# Patient Record
Sex: FEMALE | Race: BLACK OR AFRICAN AMERICAN | NOT HISPANIC OR LATINO | URBAN - METROPOLITAN AREA
[De-identification: names, ages, dates, MRNs, and addresses within clinical notes are randomized per-mention and may not be internally consistent; named-entity substitution may affect disease eponyms.]

---

## 2024-01-12 ENCOUNTER — HOSPITAL ENCOUNTER (EMERGENCY)
Facility: HOSPITAL | Age: 44
Discharge: HOME | End: 2024-01-12
Attending: STUDENT IN AN ORGANIZED HEALTH CARE EDUCATION/TRAINING PROGRAM
Payer: MEDICAID

## 2024-01-12 VITALS
TEMPERATURE: 98 F | HEART RATE: 69 BPM | BODY MASS INDEX: 26.57 KG/M2 | OXYGEN SATURATION: 99 % | DIASTOLIC BLOOD PRESSURE: 81 MMHG | WEIGHT: 150 LBS | SYSTOLIC BLOOD PRESSURE: 173 MMHG | RESPIRATION RATE: 21 BRPM

## 2024-01-12 DIAGNOSIS — R51.9 NONINTRACTABLE HEADACHE, UNSPECIFIED CHRONICITY PATTERN, UNSPECIFIED HEADACHE TYPE: Primary | ICD-10-CM

## 2024-01-12 DIAGNOSIS — R03.0 ELEVATED BLOOD PRESSURE READING: ICD-10-CM

## 2024-01-12 LAB
ALBUMIN SERPL BCP-MCNC: 4.1 G/DL (ref 3.5–5.2)
ALP SERPL-CCNC: 50 U/L (ref 55–135)
ALT SERPL W/O P-5'-P-CCNC: 15 U/L (ref 10–44)
ANION GAP SERPL CALC-SCNC: 11 MMOL/L (ref 8–16)
AST SERPL-CCNC: 22 U/L (ref 10–40)
BASOPHILS # BLD AUTO: 0.04 K/UL (ref 0–0.2)
BASOPHILS NFR BLD: 0.7 % (ref 0–1.9)
BILIRUB SERPL-MCNC: 0.5 MG/DL (ref 0.1–1)
BILIRUB UR QL STRIP: NEGATIVE
BNP SERPL-MCNC: 42 PG/ML (ref 0–99)
BUN SERPL-MCNC: 9 MG/DL (ref 6–20)
CALCIUM SERPL-MCNC: 8.7 MG/DL (ref 8.7–10.5)
CHLORIDE SERPL-SCNC: 107 MMOL/L (ref 95–110)
CLARITY UR: CLEAR
CO2 SERPL-SCNC: 21 MMOL/L (ref 23–29)
COLOR UR: COLORLESS
CREAT SERPL-MCNC: 0.8 MG/DL (ref 0.5–1.4)
DIFFERENTIAL METHOD BLD: ABNORMAL
EOSINOPHIL # BLD AUTO: 0.1 K/UL (ref 0–0.5)
EOSINOPHIL NFR BLD: 1.5 % (ref 0–8)
ERYTHROCYTE [DISTWIDTH] IN BLOOD BY AUTOMATED COUNT: 13.8 % (ref 11.5–14.5)
EST. GFR  (NO RACE VARIABLE): >60 ML/MIN/1.73 M^2
GLUCOSE SERPL-MCNC: 80 MG/DL (ref 70–110)
GLUCOSE UR QL STRIP: NEGATIVE
HCT VFR BLD AUTO: 35.2 % (ref 37–48.5)
HGB BLD-MCNC: 11.9 G/DL (ref 12–16)
HGB UR QL STRIP: ABNORMAL
IMM GRANULOCYTES # BLD AUTO: 0.01 K/UL (ref 0–0.04)
IMM GRANULOCYTES NFR BLD AUTO: 0.2 % (ref 0–0.5)
KETONES UR QL STRIP: NEGATIVE
LEUKOCYTE ESTERASE UR QL STRIP: NEGATIVE
LYMPHOCYTES # BLD AUTO: 1.3 K/UL (ref 1–4.8)
LYMPHOCYTES NFR BLD: 23.4 % (ref 18–48)
MCH RBC QN AUTO: 33.2 PG (ref 27–31)
MCHC RBC AUTO-ENTMCNC: 33.8 G/DL (ref 32–36)
MCV RBC AUTO: 98 FL (ref 82–98)
MICROSCOPIC COMMENT: NORMAL
MONOCYTES # BLD AUTO: 0.5 K/UL (ref 0.3–1)
MONOCYTES NFR BLD: 8.3 % (ref 4–15)
NEUTROPHILS # BLD AUTO: 3.6 K/UL (ref 1.8–7.7)
NEUTROPHILS NFR BLD: 65.9 % (ref 38–73)
NITRITE UR QL STRIP: NEGATIVE
NRBC BLD-RTO: 0 /100 WBC
PH UR STRIP: 5 [PH] (ref 5–8)
PLATELET # BLD AUTO: 323 K/UL (ref 150–450)
PMV BLD AUTO: 9.5 FL (ref 9.2–12.9)
POTASSIUM SERPL-SCNC: 4.1 MMOL/L (ref 3.5–5.1)
PROT SERPL-MCNC: 7.8 G/DL (ref 6–8.4)
PROT UR QL STRIP: NEGATIVE
RBC # BLD AUTO: 3.58 M/UL (ref 4–5.4)
RBC #/AREA URNS HPF: 2 /HPF (ref 0–4)
SODIUM SERPL-SCNC: 139 MMOL/L (ref 136–145)
SP GR UR STRIP: 1.01 (ref 1–1.03)
SQUAMOUS #/AREA URNS HPF: 4 /HPF
TROPONIN I SERPL DL<=0.01 NG/ML-MCNC: <0.006 NG/ML (ref 0–0.03)
URN SPEC COLLECT METH UR: ABNORMAL
UROBILINOGEN UR STRIP-ACNC: NEGATIVE EU/DL
WBC # BLD AUTO: 5.51 K/UL (ref 3.9–12.7)
WBC #/AREA URNS HPF: 3 /HPF (ref 0–5)

## 2024-01-12 PROCEDURE — 99285 EMERGENCY DEPT VISIT HI MDM: CPT | Mod: 25

## 2024-01-12 PROCEDURE — 96374 THER/PROPH/DIAG INJ IV PUSH: CPT

## 2024-01-12 PROCEDURE — 93005 ELECTROCARDIOGRAM TRACING: CPT

## 2024-01-12 PROCEDURE — 25000003 PHARM REV CODE 250: Performed by: STUDENT IN AN ORGANIZED HEALTH CARE EDUCATION/TRAINING PROGRAM

## 2024-01-12 PROCEDURE — 25000003 PHARM REV CODE 250

## 2024-01-12 PROCEDURE — 80053 COMPREHEN METABOLIC PANEL: CPT

## 2024-01-12 PROCEDURE — 93010 ELECTROCARDIOGRAM REPORT: CPT | Mod: ,,, | Performed by: INTERNAL MEDICINE

## 2024-01-12 PROCEDURE — 85025 COMPLETE CBC W/AUTO DIFF WBC: CPT

## 2024-01-12 PROCEDURE — 84484 ASSAY OF TROPONIN QUANT: CPT

## 2024-01-12 PROCEDURE — 63600175 PHARM REV CODE 636 W HCPCS

## 2024-01-12 PROCEDURE — 83880 ASSAY OF NATRIURETIC PEPTIDE: CPT

## 2024-01-12 PROCEDURE — 81000 URINALYSIS NONAUTO W/SCOPE: CPT

## 2024-01-12 RX ORDER — ONDANSETRON HYDROCHLORIDE 2 MG/ML
4 INJECTION, SOLUTION INTRAVENOUS
Status: COMPLETED | OUTPATIENT
Start: 2024-01-12 | End: 2024-01-12

## 2024-01-12 RX ORDER — AMLODIPINE BESYLATE 5 MG/1
5 TABLET ORAL
Status: COMPLETED | OUTPATIENT
Start: 2024-01-12 | End: 2024-01-12

## 2024-01-12 RX ORDER — ACETAMINOPHEN 325 MG/1
650 TABLET ORAL
Status: COMPLETED | OUTPATIENT
Start: 2024-01-12 | End: 2024-01-12

## 2024-01-12 RX ORDER — BUTALBITAL, ACETAMINOPHEN AND CAFFEINE 50; 325; 40 MG/1; MG/1; MG/1
1 TABLET ORAL EVERY 6 HOURS PRN
Qty: 15 TABLET | Refills: 0 | Status: SHIPPED | OUTPATIENT
Start: 2024-01-12 | End: 2024-01-17

## 2024-01-12 RX ADMIN — ONDANSETRON 4 MG: 2 INJECTION INTRAMUSCULAR; INTRAVENOUS at 05:01

## 2024-01-12 RX ADMIN — AMLODIPINE BESYLATE 5 MG: 5 TABLET ORAL at 05:01

## 2024-01-12 RX ADMIN — ACETAMINOPHEN 650 MG: 325 TABLET ORAL at 05:01

## 2024-01-12 NOTE — ED PROVIDER NOTES
Encounter Date: 1/12/2024       History     Chief Complaint   Patient presents with    Headache    Hypertension     PT reports took BP at home and reading was 189/119. Also c/o Headache, nausea and dizziness. History of HTN and takes amlodipine 5mg daily-last taken yesterday. PCP instructed her to come to ED for evaluation.      HPI    43-year-old female with past medical history of hypertension, anxiety who presents with elevated blood pressure reading.  Patient reports that she has had elevation in her blood pressure of the past 3 weeks.  States that this morning it was 189/119.  States she called her PCP and was advised to come to the ED for evaluation.  In addition to elevated blood pressure reading.  Patient also reports associated headache, nausea and lightheadedness that started today.  She attempted to treat symptoms at home with Midol with no relief.  She also notes that her blood pressure yesterday was 195/107.  Patient was prescribed amlodipine 5 mg 3 weeks ago by her PCP however she reports that she did not take that medication today because she felt as that medication was not working.  She does endorse alcohol use as well as tobacco use.  Denies drug use.  Denies other associated symptoms.    Review of patient's allergies indicates:  No Known Allergies  Past Medical History:   Diagnosis Date    Anxiety     Hypertension      Past Surgical History:   Procedure Laterality Date    EYE SURGERY       Family History   Problem Relation Age of Onset    Kidney cancer Mother      Social History     Tobacco Use    Smoking status: Former     Types: Cigarettes    Smokeless tobacco: Former   Substance Use Topics    Alcohol use: Yes     Comment: OCCASIONALLY    Drug use: Yes     Types: Marijuana     Review of Systems   Constitutional:  Negative for chills and fever.   HENT:  Negative for congestion and trouble swallowing.    Eyes:  Negative for visual disturbance.   Respiratory:  Negative for shortness of breath.     Cardiovascular:  Negative for chest pain and leg swelling.   Gastrointestinal:  Positive for nausea. Negative for abdominal pain and vomiting.   Genitourinary:  Negative for decreased urine volume and difficulty urinating.   Musculoskeletal:  Negative for back pain, neck pain and neck stiffness.   Skin:  Negative for rash and wound.   Neurological:  Positive for light-headedness and headaches. Negative for seizures, syncope, speech difficulty and weakness.   Psychiatric/Behavioral:  Negative for confusion.        Physical Exam     Initial Vitals [01/12/24 1630]   BP Pulse Resp Temp SpO2   (!) 192/96 76 16 98.2 °F (36.8 °C) 100 %      MAP       --         Physical Exam    Nursing note and vitals reviewed.  Constitutional: She appears well-developed and well-nourished. She is not diaphoretic.  Non-toxic appearance. She does not have a sickly appearance. She does not appear ill.   Hypertensive.  Blood pressure 192/96.   HENT:   Head: Normocephalic and atraumatic.   Right Ear: External ear normal.   Left Ear: External ear normal.   Nose: Nose normal.   Mouth/Throat: Oropharynx is clear and moist.   Eyes: Conjunctivae and EOM are normal. Pupils are equal, round, and reactive to light. No scleral icterus.   Neck: Neck supple. No JVD present.   Normal range of motion.  Cardiovascular:  Normal rate, regular rhythm, normal heart sounds and intact distal pulses.           Pulmonary/Chest: Breath sounds normal. No stridor. No respiratory distress.   Abdominal: Abdomen is soft. She exhibits no distension. There is no abdominal tenderness. There is no rebound and no guarding.   Musculoskeletal:         General: No tenderness or edema. Normal range of motion.      Cervical back: Normal range of motion and neck supple.     Neurological: She is alert and oriented to person, place, and time. She has normal strength. She displays no atrophy and no tremor. No cranial nerve deficit or sensory deficit. She exhibits normal muscle tone.  She displays no seizure activity. Gait normal. GCS eye subscore is 4. GCS verbal subscore is 5. GCS motor subscore is 6.   Moves all extremities, follows all commands, no focal neurologic deficits.      Skin: Skin is warm and dry. No rash noted. No erythema.   Psychiatric: She has a normal mood and affect.         ED Course   Procedures  Labs Reviewed   CBC W/ AUTO DIFFERENTIAL - Abnormal; Notable for the following components:       Result Value    RBC 3.58 (*)     Hemoglobin 11.9 (*)     Hematocrit 35.2 (*)     MCH 33.2 (*)     All other components within normal limits   COMPREHENSIVE METABOLIC PANEL - Abnormal; Notable for the following components:    CO2 21 (*)     Alkaline Phosphatase 50 (*)     All other components within normal limits   URINALYSIS, REFLEX TO URINE CULTURE - Abnormal; Notable for the following components:    Color, UA Colorless (*)     Occult Blood UA 3+ (*)     All other components within normal limits    Narrative:     Specimen Source->Urine   TROPONIN I   B-TYPE NATRIURETIC PEPTIDE   URINALYSIS MICROSCOPIC    Narrative:     Specimen Source->Urine     EKG Readings: (Independently Interpreted)   Normal sinus rhythm with a rate of 73 beats per minute, normal axis and intervals, biphasic T-wave in V1.  No obvious acute ST segment changes.  No old EKG in [facility] to compare.       Imaging Results              X-Ray Chest AP Portable (Final result)  Result time 01/12/24 17:42:36      Final result by Isidro Musa MD (01/12/24 17:42:36)                   Impression:      No acute cardiopulmonary process identified.      Electronically signed by: Isidro Musa MD  Date:    01/12/2024  Time:    17:42               Narrative:    EXAMINATION:  XR CHEST AP PORTABLE    CLINICAL HISTORY:  Essential (primary) hypertension    TECHNIQUE:  Single frontal view of the chest was performed.    COMPARISON:  None    FINDINGS:  Cardiac silhouette is normal in size.  Lungs are symmetrically expanded.  No evidence  of focal consolidative process, pneumothorax, or significant pleural effusion.  No acute osseous abnormality identified.                                       CT Head Without Contrast (Final result)  Result time 01/12/24 17:01:30      Final result by Valdez Zuniga MD (01/12/24 17:01:30)                   Impression:      No acute abnormality.      Electronically signed by: Valdez Zuniga MD  Date:    01/12/2024  Time:    17:01               Narrative:    EXAMINATION:  CT HEAD WITHOUT CONTRAST    CLINICAL HISTORY:  headache, hypertensive;    TECHNIQUE:  Low dose axial CT images obtained throughout the head without intravenous contrast. Sagittal and coronal reconstructions were performed.    COMPARISON:  None.    FINDINGS:  Intracranial compartment:    Ventricles and sulci are normal in size for age without evidence of hydrocephalus. No extra-axial blood or fluid collections.    The brain parenchyma appears normal. No parenchymal mass, hemorrhage, edema or major vascular distribution infarct.    Skull/extracranial contents (limited evaluation): No fracture. Mastoid air cells and paranasal sinuses are essentially clear.                                       Medications   ondansetron injection 4 mg (4 mg Intravenous Given 1/12/24 1709)   amLODIPine tablet 5 mg (5 mg Oral Given 1/12/24 1741)   acetaminophen tablet 650 mg (650 mg Oral Given 1/12/24 1754)     Medical Decision Making   MDM  This is an emergent evaluation of a 43-year-old female with past medical history of hypertension, anxiety who presents with elevated blood pressure reading. Initial vitals in the ED [01/12/24 1630]  BP: (!) 192/96  Pulse: 76  Resp: 16  Temp: 98.2 °F (36.8 °C)  SpO2: 100 % .     Physical exam noted above. DDx includes but is not limited to uncontrolled hypertension, migraine versus tension headache, hypertensive emergency. Also considered but clinically less likely to be stroke, dissection, AAA, meningitis, sepsis. Will obtain labs  and imaging including CBC, CMP, BNP, troponin, UA, chest x-ray, CT head without contrast. Will also provide home dose of antihypertensive, pain medication for headache. Will continue to monitor and frequently reassess pending results of labs, treatments and final disposition.    Patient is aware of plan and is amenable.     Deena Jordan D.O  EMERGENCY MEDICINE  5:42 PM 01/12/2024      UPDATE  Labs unremarkable.  Chest x-ray shows no acute abnormality.  CT head shows no acute intracranial abnormality.  EKG shows no acute STEMI.  Patient was treated in the ED with 5 mg of her amlodipine as well as Zofran and p.o. Tylenol.  On reassessment her blood pressure is improved.  Patient able to ambulate in the ED without difficulty or assistance.  Patient still complains of a headache that is unchanged.  However she was requesting if she can be discharged with a medication for headache instead of taking it here.  Will prescribe Fioricet.  Patient also has a scheduled follow-up appointment with her PCP for blood pressure rechecked.  I encouraged her to continue to take blood pressure at home and document.  She was also encouraged to take her medicine, with increasing the dose to 10 mg daily until she follows up with her primary care doctor.  She was given ED return precautions.  Patient is aware and agreeable to plan.    Deena Jordan D.O  EMERGENCY MEDICINE   7:01 PM 01/12/2024         This chart was completed using dictation software, as a result there may be some transcription errors     Amount and/or Complexity of Data Reviewed  External Data Reviewed: labs and notes.  Labs: ordered. Decision-making details documented in ED Course.  Radiology: ordered and independent interpretation performed. Decision-making details documented in ED Course.  ECG/medicine tests: ordered and independent interpretation performed. Decision-making details documented in ED Course.    Risk  OTC drugs.  Prescription drug  management.                                      Clinical Impression:  Final diagnoses:  [R51.9] Nonintractable headache, unspecified chronicity pattern, unspecified headache type (Primary)  [R03.0] Elevated blood pressure reading          ED Disposition Condition    Discharge Stable          ED Prescriptions       Medication Sig Dispense Start Date End Date Auth. Provider    butalbital-acetaminophen-caffeine -40 mg (FIORICET, ESGIC) -40 mg per tablet Take 1 tablet by mouth every 6 (six) hours as needed for Pain or Headaches. 15 tablet 1/12/2024 1/17/2024 Deena Jordan, DO          Follow-up Information       Follow up With Specialties Details Why Contact Info    Your primary care physician  Go on 1/16/2024 Emergency Room Follow-up, keep this previously scheduled follow-up appointment     [facility] Emergency Dept Emergency Medicine Go to  If symptoms worsen [address] [facility] [place] [address]  [phone]             Deena Jordan DO  01/12/24 1903

## 2024-01-13 NOTE — DISCHARGE INSTRUCTIONS
Thank you for coming to our Emergency Department today. It is important to remember that some problems or medical conditions are difficult to diagnose and may not be found during your Emergency Department visit.     Be sure to follow up with your primary care doctor and review all labs/imaging/tests that were performed during your ER visit with them. Some labs/tests may be outside of the normal range and require non-emergent follow-up and further investigation to help diagnose/exclude/prevent complications or other potentially serious medical conditions that were not addressed during your ER visit.    If you do not have a primary care doctor, you may contact the one listed on your discharge paperwork or you may also call the [facility] Appointment Desk at [phone] to schedule an appointment and establish care with one. It is important to your health that you have a primary care doctor.    Please take all medications as directed. All medications may potentially have side-effects and it is impossible to predict which medications may give you side-effects or what side-effects (if any) they will give you.. If you feel that you are having a negative effect or side-effect of any medication you should immediately stop taking them and seek medical attention. If you feel that you are having a life-threatening reaction call 911.    Return to the ER with any questions/concerns, new/concerning symptoms, worsening or failure to improve.     Do not drive, swim, climb to height, take a bath, operate heavy machinery, drink alcohol or take potentially sedating medications, sign any legal documents or make any important decisions for 24 hours if you have received any pain medications, sedatives or mood altering drugs during your ER visit or within 24 hours of taking them if they have been prescribed to you.     You can find additional resources for Dentists, hearing aids, durable medical equipment, low cost pharmacies and  other resources at [email or web address].org    BELOW THIS LINE ONLY APPLIES IF YOU HAVE A COVID TEST PENDING OR IF YOU HAVE BEEN DIAGNOSED WITH COVID:  Please access [facility] to review the results of your test. Until the results of your COVID test return, you should isolate yourself so as not to potentially spread illness to others.   If your COVID test returns positive, you should isolate yourself so as not to spread illness to others. After five full days, if you are feeling better and you have not had fever for 24 hours, you can return to your typical daily activities, but you must wear a mask around others for an additional 5 days.   If your COVID test returns negative and you are either unvaccinated or more than six months out from your two-dose vaccine and are not yet boosted, you should still quarantine for 5 full days followed by strict mask use for an additional 5 full days.   If your COVID test returns negative and you have received your 2-dose initial vaccine as well as a booster, you should continue strict mask use for 10 full days after the exposure.  For all those exposed, best practice includes a test at day 5 after the exposure. This can be a home test or a test through one of the many testing centers throughout our community.   Masking is always advised to limit the spread of COVID. Cdc.gov is an excellent site to obtain the latest up to date recommendations regarding COVID and COVID testing.     CDC Testing and Quarantine Guidelines for patients with exposure to a known-positive COVID-19 person:  A close exposure is defined as anyone who has had an exposure (masked or unmasked) to a known COVID -19 positive person within 6 feet of someone for a cumulative total of 15 minutes or more over a 24-hour period.   Vaccinated and/or if you recently had a positive covid test within 90 days do NOT need to quarantine after contact with someone who had COVID-19 unless you develop symptoms.   Fully vaccinated  people who have not had a positive test within 90 days, should get tested 3-5 days after their exposure, even if they don't have symptoms and wear a mask indoors in public for 14 days following exposure or until their test result is negative.      Unvaccinated and/or NOT had a positive test within 90 days and meet close exposure  You are required by CDC guidelines to quarantine for at least 5 days from time of exposure followed by 5 days of strict masking. It is recommended, but not required to test after 5 days, unless you develop symptoms, in which case you should test at that time.  If you get tested after 5 days and your test is positive, your 5 day period of isolation starts the day of the positive test.    If your exposure does not meet the above definition, you can return to your normal daily activities to include social distancing, wearing a mask and frequent handwashing.      Here is a link to guidance from the CDC:  https://www.cdc.gov/media/releases/2021/s1227-isolation-quarantine-guidance.html      [place] Dept Of Health Testing Sites:  https://ldh.la.gov/page/3934      [facility] website with testing locations and guidance:  [email or web address]

## 2024-05-09 ENCOUNTER — PATIENT MESSAGE (OUTPATIENT)
Facility: CLINIC | Age: 44
End: 2024-05-09
Payer: MEDICAID

## 2024-05-10 ENCOUNTER — PATIENT MESSAGE (OUTPATIENT)
Facility: CLINIC | Age: 44
End: 2024-05-10
Payer: MEDICAID

## 2024-09-23 ENCOUNTER — OFFICE VISIT (OUTPATIENT)
Facility: CLINIC | Age: 44
End: 2024-09-23
Payer: MEDICAID

## 2024-09-23 VITALS
TEMPERATURE: 98 F | DIASTOLIC BLOOD PRESSURE: 80 MMHG | SYSTOLIC BLOOD PRESSURE: 132 MMHG | HEIGHT: 64 IN | WEIGHT: 151 LBS | OXYGEN SATURATION: 98 % | RESPIRATION RATE: 18 BRPM | HEART RATE: 102 BPM | BODY MASS INDEX: 25.78 KG/M2

## 2024-09-23 DIAGNOSIS — Z30.42 ENCOUNTER FOR SURVEILLANCE OF INJECTABLE CONTRACEPTIVE: Primary | ICD-10-CM

## 2024-09-23 DIAGNOSIS — I10 PRIMARY HYPERTENSION: ICD-10-CM

## 2024-09-23 DIAGNOSIS — Z12.4 ENCOUNTER FOR SCREENING FOR CERVICAL CANCER: ICD-10-CM

## 2024-09-23 DIAGNOSIS — Z12.31 ENCOUNTER FOR SCREENING MAMMOGRAM FOR BREAST CANCER: ICD-10-CM

## 2024-09-23 PROBLEM — Z65.9 PROBLEM RELATED TO UNSPECIFIED PSYCHOSOCIAL CIRCUMSTANCES: Status: ACTIVE | Noted: 2024-09-23

## 2024-09-23 LAB
B-HCG UR QL: NEGATIVE
CTP QC/QA: YES

## 2024-09-23 PROCEDURE — 3008F BODY MASS INDEX DOCD: CPT | Mod: CPTII,,,

## 2024-09-23 PROCEDURE — 99214 OFFICE O/P EST MOD 30 MIN: CPT | Mod: PBBFAC,PN

## 2024-09-23 PROCEDURE — 81025 URINE PREGNANCY TEST: CPT | Mod: PBBFAC,PN

## 2024-09-23 PROCEDURE — 96372 THER/PROPH/DIAG INJ SC/IM: CPT | Mod: PBBFAC,PN

## 2024-09-23 PROCEDURE — 3079F DIAST BP 80-89 MM HG: CPT | Mod: CPTII,,,

## 2024-09-23 PROCEDURE — 99999PBSHW PR PBB SHADOW TECHNICAL ONLY FILED TO HB: Mod: PBBFAC,,,

## 2024-09-23 PROCEDURE — 99999 PR PBB SHADOW E&M-EST. PATIENT-LVL IV: CPT | Mod: PBBFAC,,,

## 2024-09-23 PROCEDURE — 3075F SYST BP GE 130 - 139MM HG: CPT | Mod: CPTII,,,

## 2024-09-23 PROCEDURE — 99999PBSHW POCT URINE PREGNANCY: Mod: PBBFAC,,,

## 2024-09-23 PROCEDURE — 99214 OFFICE O/P EST MOD 30 MIN: CPT | Mod: S$PBB,,,

## 2024-09-23 PROCEDURE — 4010F ACE/ARB THERAPY RXD/TAKEN: CPT | Mod: CPTII,,,

## 2024-09-23 RX ORDER — MEDROXYPROGESTERONE ACETATE 150 MG/ML
150 INJECTION, SUSPENSION INTRAMUSCULAR ONCE
Status: COMPLETED | OUTPATIENT
Start: 2024-09-23 | End: 2024-09-23

## 2024-09-23 RX ORDER — BUDESONIDE 1 MG/2ML
INHALANT ORAL
COMMUNITY
Start: 2024-05-23

## 2024-09-23 RX ADMIN — MEDROXYPROGESTERONE ACETATE 150 MG: 150 INJECTION, SUSPENSION INTRAMUSCULAR at 04:09

## 2024-09-23 NOTE — PROGRESS NOTES
SUBJECTIVE     Chief Complaint   Patient presents with    [facility] [place] [facility]       [facility]  Malena Mulligan is a 44 y.o. female with multiple medical diagnoses as listed in the medical history and problem list that presents for follow-up Depo shot.     HPI     Patient last depo was in April. Patient denies any shortness of breath, dizziness, headaches, N/V, vision changes, chest pains, or palpitations at present time.  Patient denies any complaints or concerns.     Discussed care gaps with patient. Elevated BP during visit 152/90. BP rechecked manually--132/80.     PAST MEDICAL HISTORY:  Past Medical History:   Diagnosis Date    Anxiety     Hypertension        ALLERGIES AND MEDICATIONS: updated and reviewed.  Review of patient's allergies indicates:  No Known Allergies  Current Outpatient Medications   Medication Sig Dispense Refill    budesonide 1 mg/2 mL NbSp Inhalation for 30 Days      levocetirizine (XYZAL) 5 MG tablet Take 1 tablet (5 mg total) by mouth every evening. 30 tablet 11    amLODIPine (NORVASC) 10 MG tablet Take 1 tablet (10 mg total) by mouth once daily. 30 tablet 1    famotidine (PEPCID) 20 MG tablet Take 1 tablet (20 mg total) by mouth 2 (two) times daily. (Patient not taking: Reported on 9/23/2024) 60 tablet 11    fluticasone propionate (FLONASE) 50 mcg/actuation nasal spray 1 spray (50 mcg total) by Each Nostril route once daily. (Patient not taking: Reported on 9/23/2024) 15.8 mL 11    losartan (COZAAR) 50 MG tablet Take 1 tablet (50 mg total) by mouth once daily. 30 tablet 1     Current Facility-Administered Medications   Medication Dose Route Frequency Provider Last Rate Last Admin    medroxyPROGESTERone (DEPO-PROVERA) injection 150 mg  150 mg Intramuscular Once          ROS  Review of Systems   Constitutional:  Negative for activity change, chills, fatigue and fever.   HENT:  Negative for congestion, facial swelling, rhinorrhea and sore throat.    Respiratory:  Negative for cough, chest tightness and  "shortness of breath.    Cardiovascular:  Negative for chest pain and palpitations.   Gastrointestinal:  Negative for abdominal pain, constipation, diarrhea, nausea and vomiting.   Genitourinary:  Negative for dysuria.   Musculoskeletal:  Negative for back pain and joint swelling.   Skin:  Negative for rash and wound.   Neurological:  Negative for dizziness, speech difficulty, light-headedness and headaches.   Psychiatric/Behavioral:  Negative for behavioral problems, dysphoric mood and sleep disturbance. The patient is not nervous/anxious.        OBJECTIVE     Physical Exam  Vitals:    09/23/24 1627   BP: 132/80   Pulse:    Resp:    Temp:     Body mass index is 25.92 kg/m².  Weight: 68.5 kg (151 lb 0.2 oz)   Height: 5' 4" (162.6 cm)     Physical Exam  Vitals reviewed.   Constitutional:       General: She is not in acute distress.  HENT:      Right Ear: External ear normal.      Left Ear: External ear normal.      Nose: Nose normal.      Mouth/Throat:      Mouth: Mucous membranes are moist.   Eyes:      Extraocular Movements: Extraocular movements intact.      Conjunctiva/sclera: Conjunctivae normal.      Pupils: Pupils are equal, round, and reactive to light.   Cardiovascular:      Rate and Rhythm: Normal rate and regular rhythm.      Pulses: Normal pulses.      Heart sounds: Normal heart sounds.   Pulmonary:      Effort: Pulmonary effort is normal.      Breath sounds: Normal breath sounds.   Abdominal:      General: Bowel sounds are normal. There is no distension.      Palpations: Abdomen is soft.   Musculoskeletal:         General: No swelling. Normal range of motion.      Cervical back: Normal range of motion.   Skin:     General: Skin is warm and dry.      Findings: No rash.   Neurological:      General: No focal deficit present.      Mental Status: She is alert and oriented to person, place, and time.   Psychiatric:         Mood and Affect: Mood normal.         Behavior: Behavior normal.         Thought " Content: Thought content normal.         Judgment: Judgment normal.       Health Maintenance         Date Due Completion Date    Hepatitis C Screening Never done ---    Cervical Cancer Screening Never done ---    HIV Screening Never done ---    Mammogram Never done ---    Influenza Vaccine (1) Never done ---    COVID-19 Vaccine (1 - 2023-24 season) Never done ---    Hemoglobin A1c (Diabetic Prevention Screening) 12/11/2026 12/11/2023    TETANUS VACCINE 05/17/2028 5/17/2018              ASSESSMENT     44 y.o. female with     1. Encounter for surveillance of injectable contraceptive    2. Primary hypertension    3. Encounter for screening for cervical cancer    4. Encounter for screening mammogram for breast cancer        PLAN:     1. Encounter for surveillance of injectable contraceptive  Discussed options for hormonal and non-hormonal contraceptive including: condoms, OCPs, patch, NuvaRing, Depo Provera, Nexplanon, Mirena, and copper IUD. Encouraged condom use for STI prevention as well as a secondary contraceptive for back up pregnancy prevention. Discussed indications, directions for use, and side effects/risks of these methods of contraception. Discussed emergency contraception such as Plan B including indications for use, where to obtain, directions for use, and side effects/risks. Provided time for answering patient's questions and addressing her concerns regarding these options. Patient would like to get the depo shot  - POCT urine pregnancy  - medroxyPROGESTERone (DEPO-PROVERA) injection 150 mg    2. Primary Hypertension  Ongoing. Continue losartan and amlodipine.  Advised to maintain a low Na diet(<2g/day), exercise, and keep BP log to present to next visit     3. Encounter for screening for cervical cancer  Due. Ordered.   - Ambulatory referral/consult to Gynecology; Future    4. Encounter for screening mammogram for breast cancer  Due. Ordered.   - Mammo Digital Screening Bilat w/ Ok; Future      Josie  ANA MARIA Singleton  [facility]  09/23/2024 4:17 PM          Follow up in about 2 weeks (around 10/7/2024) for Nurse BP check.

## 2024-09-30 ENCOUNTER — OFFICE VISIT (OUTPATIENT)
Dept: OBSTETRICS AND GYNECOLOGY | Facility: CLINIC | Age: 44
End: 2024-09-30
Payer: MEDICAID

## 2024-09-30 VITALS
BODY MASS INDEX: 25.6 KG/M2 | WEIGHT: 149.94 LBS | DIASTOLIC BLOOD PRESSURE: 88 MMHG | SYSTOLIC BLOOD PRESSURE: 134 MMHG | HEIGHT: 64 IN

## 2024-09-30 DIAGNOSIS — Z12.4 ENCOUNTER FOR SCREENING FOR CERVICAL CANCER: ICD-10-CM

## 2024-09-30 DIAGNOSIS — Z01.419 WELL WOMAN EXAM WITH ROUTINE GYNECOLOGICAL EXAM: Primary | ICD-10-CM

## 2024-09-30 PROCEDURE — 1160F RVW MEDS BY RX/DR IN RCRD: CPT | Mod: CPTII,,, | Performed by: STUDENT IN AN ORGANIZED HEALTH CARE EDUCATION/TRAINING PROGRAM

## 2024-09-30 PROCEDURE — 4010F ACE/ARB THERAPY RXD/TAKEN: CPT | Mod: CPTII,,, | Performed by: STUDENT IN AN ORGANIZED HEALTH CARE EDUCATION/TRAINING PROGRAM

## 2024-09-30 PROCEDURE — 3079F DIAST BP 80-89 MM HG: CPT | Mod: CPTII,,, | Performed by: STUDENT IN AN ORGANIZED HEALTH CARE EDUCATION/TRAINING PROGRAM

## 2024-09-30 PROCEDURE — 3075F SYST BP GE 130 - 139MM HG: CPT | Mod: CPTII,,, | Performed by: STUDENT IN AN ORGANIZED HEALTH CARE EDUCATION/TRAINING PROGRAM

## 2024-09-30 PROCEDURE — 99213 OFFICE O/P EST LOW 20 MIN: CPT | Mod: PBBFAC | Performed by: STUDENT IN AN ORGANIZED HEALTH CARE EDUCATION/TRAINING PROGRAM

## 2024-09-30 PROCEDURE — 1159F MED LIST DOCD IN RCRD: CPT | Mod: CPTII,,, | Performed by: STUDENT IN AN ORGANIZED HEALTH CARE EDUCATION/TRAINING PROGRAM

## 2024-09-30 PROCEDURE — 99386 PREV VISIT NEW AGE 40-64: CPT | Mod: S$PBB,,, | Performed by: STUDENT IN AN ORGANIZED HEALTH CARE EDUCATION/TRAINING PROGRAM

## 2024-09-30 PROCEDURE — 3008F BODY MASS INDEX DOCD: CPT | Mod: CPTII,,, | Performed by: STUDENT IN AN ORGANIZED HEALTH CARE EDUCATION/TRAINING PROGRAM

## 2024-09-30 PROCEDURE — 99999 PR PBB SHADOW E&M-EST. PATIENT-LVL III: CPT | Mod: PBBFAC,,, | Performed by: STUDENT IN AN ORGANIZED HEALTH CARE EDUCATION/TRAINING PROGRAM

## 2024-09-30 NOTE — PROGRESS NOTES
Subjective     Patient ID: Malena Mulligan is a 44 y.o. female.    Chief Complaint:  Well Woman      History of Present Illness  43 yo  presents for E    SA with women, no concern for STIs currently.  Restarted on DMPA within last year, had a 10 year hiatus. Primarily for period management   Due for pap, mammogram ordered.       Annual Exam-Premenopausal  Patient presents for annual exam. The patient has no complaints today. The patient is sexually active. GYN screening history: last pap: was normal. The patient wears seatbelts: yes. The patient participates in regular exercise: no. Has the patient ever been transfused or tattooed?:  yes: tattoo . The patient reports that there is not domestic violence in her life.    GYN & OB History  No LMP recorded.   Date of Last Pap: No result found    OB History    Para Term  AB Living   4       1 3   SAB IAB Ectopic Multiple Live Births                  # Outcome Date GA Lbr Luan/2nd Weight Sex Type Anes PTL Lv   4             3             2             1 AB                Review of Systems  Review of Systems   All other systems reviewed and are negative.         Objective   Physical Exam:   Constitutional: She appears well-developed and well-nourished.    HENT:   Head: Normocephalic and atraumatic.    Eyes: Conjunctivae and EOM are normal.      Pulmonary/Chest: Effort normal. Right breast exhibits no mass and no nipple discharge. Left breast exhibits no mass and no nipple discharge.        Abdominal: Soft.     Genitourinary:    Right adnexa and left adnexa normal.      Pelvic exam was performed with patient in the lithotomy position.   The external female genitalia was normal.   There is no lesion on the right labia. There is no lesion on the left labia. Cervix is normal. Right adnexum displays no mass. Left adnexum displays no mass. There is vaginal discharge in the vagina.    pap smear completed   Genitourinary Comments: Female  chaperone was present for duration of exam             Musculoskeletal: Normal range of motion.       Neurological: She is alert.    Skin: Skin is warm and dry.    Psychiatric: She has a normal mood and affect. Her behavior is normal.            Assessment and Plan     1. Well woman exam with routine gynecological exam    2. Encounter for screening for cervical cancer        Plan:  1. Well woman exam with routine gynecological exam (Primary)  - Pap done today.  - Screening tests as ordered.  - Diet and exercise encouraged.  Reviewed ASCCP Pap guidelines and screening recommendations.    Counseling: Contraception:Depo-Provera  Smoking  Health Screens: Mammography  Colonoscopy:discussed  Health Maintenance reviewed    2. Encounter for screening for cervical cancer  - Ambulatory referral/consult to Gynecology  - Liquid-Based Pap Smear, Screening  - HPV High Risk Genotypes, PCR      Follow up for WWE in 1 year or sooner as needed    Sushant Arnold III, MD  [facility] - OB GYN   [address]  [place] [address]   [phone]

## 2024-10-04 ENCOUNTER — HOSPITAL ENCOUNTER (OUTPATIENT)
Dept: RADIOLOGY | Facility: HOSPITAL | Age: 44
Discharge: HOME | End: 2024-10-04
Payer: MEDICAID

## 2024-10-04 DIAGNOSIS — Z12.31 ENCOUNTER FOR SCREENING MAMMOGRAM FOR BREAST CANCER: ICD-10-CM

## 2024-10-04 PROCEDURE — 77063 BREAST TOMOSYNTHESIS BI: CPT | Mod: TC

## 2024-10-04 PROCEDURE — 77067 SCR MAMMO BI INCL CAD: CPT | Mod: TC

## 2024-10-08 ENCOUNTER — HOSPITAL ENCOUNTER (EMERGENCY)
Facility: HOSPITAL | Age: 44
Discharge: HOME | End: 2024-10-08
Attending: EMERGENCY MEDICINE
Payer: MEDICAID

## 2024-10-08 VITALS
SYSTOLIC BLOOD PRESSURE: 163 MMHG | BODY MASS INDEX: 25.61 KG/M2 | DIASTOLIC BLOOD PRESSURE: 85 MMHG | HEIGHT: 64 IN | HEART RATE: 85 BPM | TEMPERATURE: 98 F | OXYGEN SATURATION: 98 % | WEIGHT: 150 LBS | RESPIRATION RATE: 26 BRPM

## 2024-10-08 DIAGNOSIS — I10 HYPERTENSION: ICD-10-CM

## 2024-10-08 DIAGNOSIS — R09.81 NASAL CONGESTION: Primary | ICD-10-CM

## 2024-10-08 DIAGNOSIS — R11.10 VOMITING: ICD-10-CM

## 2024-10-08 LAB
ALBUMIN SERPL BCP-MCNC: 4.6 G/DL (ref 3.5–5.2)
ALP SERPL-CCNC: 60 U/L (ref 55–135)
ALT SERPL W/O P-5'-P-CCNC: 19 U/L (ref 10–44)
ANION GAP SERPL CALC-SCNC: 13 MMOL/L (ref 8–16)
AST SERPL-CCNC: 24 U/L (ref 10–40)
B-HCG UR QL: NEGATIVE
BACTERIA #/AREA URNS HPF: ABNORMAL /HPF
BASOPHILS # BLD AUTO: 0.05 K/UL (ref 0–0.2)
BASOPHILS NFR BLD: 0.7 % (ref 0–1.9)
BILIRUB SERPL-MCNC: 0.9 MG/DL (ref 0.1–1)
BILIRUB UR QL STRIP: NEGATIVE
BNP SERPL-MCNC: 15 PG/ML (ref 0–99)
BUN SERPL-MCNC: 9 MG/DL (ref 6–20)
CALCIUM SERPL-MCNC: 10.5 MG/DL (ref 8.7–10.5)
CHLORIDE SERPL-SCNC: 104 MMOL/L (ref 95–110)
CLARITY UR: CLEAR
CO2 SERPL-SCNC: 23 MMOL/L (ref 23–29)
COLOR UR: YELLOW
CREAT SERPL-MCNC: 0.8 MG/DL (ref 0.5–1.4)
CTP QC/QA: YES
DIFFERENTIAL METHOD BLD: ABNORMAL
EOSINOPHIL # BLD AUTO: 0.6 K/UL (ref 0–0.5)
EOSINOPHIL NFR BLD: 7.9 % (ref 0–8)
ERYTHROCYTE [DISTWIDTH] IN BLOOD BY AUTOMATED COUNT: 12.6 % (ref 11.5–14.5)
EST. GFR  (NO RACE VARIABLE): >60 ML/MIN/1.73 M^2
GLUCOSE SERPL-MCNC: 81 MG/DL (ref 70–110)
GLUCOSE UR QL STRIP: NEGATIVE
HCT VFR BLD AUTO: 40 % (ref 37–48.5)
HGB BLD-MCNC: 13.9 G/DL (ref 12–16)
HGB UR QL STRIP: NEGATIVE
IMM GRANULOCYTES # BLD AUTO: 0.01 K/UL (ref 0–0.04)
IMM GRANULOCYTES NFR BLD AUTO: 0.1 % (ref 0–0.5)
KETONES UR QL STRIP: ABNORMAL
LEUKOCYTE ESTERASE UR QL STRIP: ABNORMAL
LIPASE SERPL-CCNC: 42 U/L (ref 4–60)
LYMPHOCYTES # BLD AUTO: 2.9 K/UL (ref 1–4.8)
LYMPHOCYTES NFR BLD: 40.1 % (ref 18–48)
MAGNESIUM SERPL-MCNC: 2 MG/DL (ref 1.6–2.6)
MCH RBC QN AUTO: 33.3 PG (ref 27–31)
MCHC RBC AUTO-ENTMCNC: 34.8 G/DL (ref 32–36)
MCV RBC AUTO: 96 FL (ref 82–98)
MICROSCOPIC COMMENT: ABNORMAL
MONOCYTES # BLD AUTO: 0.6 K/UL (ref 0.3–1)
MONOCYTES NFR BLD: 8 % (ref 4–15)
NEUTROPHILS # BLD AUTO: 3.1 K/UL (ref 1.8–7.7)
NEUTROPHILS NFR BLD: 43.2 % (ref 38–73)
NITRITE UR QL STRIP: NEGATIVE
NRBC BLD-RTO: 0 /100 WBC
PH UR STRIP: 7 [PH] (ref 5–8)
PLATELET # BLD AUTO: 319 K/UL (ref 150–450)
PMV BLD AUTO: 8.9 FL (ref 9.2–12.9)
POC MOLECULAR INFLUENZA A AGN: NEGATIVE
POC MOLECULAR INFLUENZA B AGN: NEGATIVE
POTASSIUM SERPL-SCNC: 3.5 MMOL/L (ref 3.5–5.1)
PROT SERPL-MCNC: 8.4 G/DL (ref 6–8.4)
PROT UR QL STRIP: NEGATIVE
RBC # BLD AUTO: 4.17 M/UL (ref 4–5.4)
RBC #/AREA URNS HPF: 2 /HPF (ref 0–4)
SARS-COV-2 RDRP RESP QL NAA+PROBE: NEGATIVE
SODIUM SERPL-SCNC: 140 MMOL/L (ref 136–145)
SP GR UR STRIP: 1.01 (ref 1–1.03)
SQUAMOUS #/AREA URNS HPF: 3 /HPF
TROPONIN I SERPL DL<=0.01 NG/ML-MCNC: <0.006 NG/ML (ref 0–0.03)
TROPONIN I SERPL DL<=0.01 NG/ML-MCNC: <0.006 NG/ML (ref 0–0.03)
URN SPEC COLLECT METH UR: ABNORMAL
UROBILINOGEN UR STRIP-ACNC: NEGATIVE EU/DL
WBC # BLD AUTO: 7.21 K/UL (ref 3.9–12.7)
WBC #/AREA URNS HPF: 8 /HPF (ref 0–5)

## 2024-10-08 PROCEDURE — 81025 URINE PREGNANCY TEST: CPT | Performed by: EMERGENCY MEDICINE

## 2024-10-08 PROCEDURE — 81000 URINALYSIS NONAUTO W/SCOPE: CPT | Performed by: EMERGENCY MEDICINE

## 2024-10-08 PROCEDURE — 85025 COMPLETE CBC W/AUTO DIFF WBC: CPT | Performed by: EMERGENCY MEDICINE

## 2024-10-08 PROCEDURE — 80053 COMPREHEN METABOLIC PANEL: CPT | Performed by: EMERGENCY MEDICINE

## 2024-10-08 PROCEDURE — 99285 EMERGENCY DEPT VISIT HI MDM: CPT | Mod: 25

## 2024-10-08 PROCEDURE — 93005 ELECTROCARDIOGRAM TRACING: CPT

## 2024-10-08 PROCEDURE — 96374 THER/PROPH/DIAG INJ IV PUSH: CPT

## 2024-10-08 PROCEDURE — 83690 ASSAY OF LIPASE: CPT | Performed by: EMERGENCY MEDICINE

## 2024-10-08 PROCEDURE — 84484 ASSAY OF TROPONIN QUANT: CPT | Performed by: EMERGENCY MEDICINE

## 2024-10-08 PROCEDURE — 83735 ASSAY OF MAGNESIUM: CPT | Performed by: EMERGENCY MEDICINE

## 2024-10-08 PROCEDURE — 25000003 PHARM REV CODE 250: Performed by: EMERGENCY MEDICINE

## 2024-10-08 PROCEDURE — 87502 INFLUENZA DNA AMP PROBE: CPT

## 2024-10-08 PROCEDURE — 83880 ASSAY OF NATRIURETIC PEPTIDE: CPT | Performed by: EMERGENCY MEDICINE

## 2024-10-08 PROCEDURE — 87635 SARS-COV-2 COVID-19 AMP PRB: CPT | Performed by: EMERGENCY MEDICINE

## 2024-10-08 PROCEDURE — 63600175 PHARM REV CODE 636 W HCPCS: Performed by: EMERGENCY MEDICINE

## 2024-10-08 PROCEDURE — 93010 ELECTROCARDIOGRAM REPORT: CPT | Mod: ,,, | Performed by: INTERNAL MEDICINE

## 2024-10-08 RX ORDER — AMLODIPINE BESYLATE 5 MG/1
5 TABLET ORAL
Status: DISCONTINUED | OUTPATIENT
Start: 2024-10-08 | End: 2024-10-08

## 2024-10-08 RX ORDER — HYDRALAZINE HYDROCHLORIDE 20 MG/ML
10 INJECTION INTRAMUSCULAR; INTRAVENOUS
Status: COMPLETED | OUTPATIENT
Start: 2024-10-08 | End: 2024-10-08

## 2024-10-08 RX ORDER — ONDANSETRON 4 MG/1
4 TABLET, ORALLY DISINTEGRATING ORAL EVERY 8 HOURS PRN
Qty: 12 TABLET | Refills: 0 | Status: SHIPPED | OUTPATIENT
Start: 2024-10-08

## 2024-10-08 RX ORDER — BENZONATATE 100 MG/1
100 CAPSULE ORAL 3 TIMES DAILY PRN
Qty: 20 CAPSULE | Refills: 0 | Status: SHIPPED | OUTPATIENT
Start: 2024-10-08 | End: 2024-10-18

## 2024-10-08 RX ORDER — CETIRIZINE HYDROCHLORIDE 10 MG/1
10 TABLET ORAL DAILY
Qty: 30 TABLET | Refills: 0 | Status: SHIPPED | OUTPATIENT
Start: 2024-10-08 | End: 2025-10-08

## 2024-10-08 RX ORDER — LOSARTAN POTASSIUM 25 MG/1
50 TABLET ORAL
Status: COMPLETED | OUTPATIENT
Start: 2024-10-08 | End: 2024-10-08

## 2024-10-08 RX ORDER — AMLODIPINE BESYLATE 5 MG/1
10 TABLET ORAL
Status: COMPLETED | OUTPATIENT
Start: 2024-10-08 | End: 2024-10-08

## 2024-10-08 RX ADMIN — AMLODIPINE BESYLATE 10 MG: 5 TABLET ORAL at 06:10

## 2024-10-08 RX ADMIN — LOSARTAN POTASSIUM 50 MG: 25 TABLET, FILM COATED ORAL at 06:10

## 2024-10-08 RX ADMIN — HYDRALAZINE HYDROCHLORIDE 10 MG: 20 INJECTION INTRAMUSCULAR; INTRAVENOUS at 07:10

## 2024-10-08 NOTE — ED TRIAGE NOTES
"Pt to ED c/o productive cough and congestion since Thursday. Reports vomiting x 2 today. Pt also reports frontal headache. Pt states "I checked my pressure and it was high" Pt also reports bilateral blurred vision. Pt AAOx4. VSS. Connected to cardiac and O2 monitor. EKG completed and shown.   "

## 2024-10-08 NOTE — Clinical Note
"Malena Antonhunter Mulligan was seen and treated in our emergency department on 10/8/2024.  She may return to work on 10/11/2024.       If you have any questions or concerns, please don't hesitate to call.      Arminda DENNY RN    "

## 2024-10-08 NOTE — ED PROVIDER NOTES
"Encounter Date: 10/8/2024    SCRIBE #1 NOTE: I, Massiel Norwood, am scribing for, and in the presence of,  Courtney Lockhart MD.       History     Chief Complaint   Patient presents with    Vomiting     Pt c/o vomiting and high blood pressure since today, and a cough x5 days. Pt stated she has been taking cough medications without relief.      45 yo F w/ PMHx of anxiety and HTN presenting to the ED for cough, concern for elevated bp. Patient reports a persistent cough since 5 days ago. Initially had subjective fevers 4-5 days ago that resolved. She has been taking Advil daytime/nighttime, and Coricidin BP for a few days which she stopped taking as she was "shaking" from it. As a result she started taking oscillococcinum today, but then started experiencing 2 episodes of vomiting, states she may not have tolerated this. She reports some abdominal discomfort prior to the 2 episodes of vomiting. She also reports anterior chest pain this morning, persistent from 8 AM to around 12 PM. She started experiencing a headache and L blurry vision (she wears contact lenses, no complete vision loss, still able to see), after the vomiting episodes this morning at work, prompting her to check her BP which was elevated at 186/100. She reports her BP usually runs normally at baseline which she checks every morning and evening. Her BP was 136/86 before leaving work today. She reports as she felt unwell last PM, she skipped her antihypertensives. She is however usually adherent. She is on Losartan and Amlodipine. No recent medications changes. She further adds her roommate was sick with a cough 2 weeks ago, no known diagnosis. She denies any complete vision loss, unilateral paresthesias or weakness, dyspnea, diarrhea, dysuria, hematuria, frequency, urgency, or other associated symptoms. No other exacerbating or alleviating factors. She denies any alcohol use lately. She admits to smoking 1 pack of cigarettes per week. She denies any illicit " drug use. No other daily medications. She does not have a menstrual period as she is on the Depo injection for birth control. PSHx of chalazion removal surgery and nasal septum surgery. NKDA.     The history is provided by the patient.     Review of patient's allergies indicates:  No Known Allergies  Past Medical History:   Diagnosis Date    Anxiety     Hypertension      Past Surgical History:   Procedure Laterality Date    EYE SURGERY      NASAL SEPTUM SURGERY  08/07/2024     Family History   Problem Relation Name Age of Onset    Kidney cancer Mother      Hypertension Mother      Hypertension Father      Heart disease Father      Breast cancer Neg Hx      Ovarian cancer Neg Hx      Colon cancer Neg Hx       Social History     Tobacco Use    Smoking status: Every Day     Current packs/day: 0.50     Average packs/day: 0.8 packs/day for 1.8 years (1.4 ttl pk-yrs)     Types: Cigarettes     Start date: 2023    Smokeless tobacco: Former   Substance Use Topics    Alcohol use: Yes     Comment: OCCASIONALLY    Drug use: Yes     Types: Marijuana     Review of Systems   Constitutional:  Positive for fatigue and fever (subjective, resolved). Negative for chills and diaphoresis.        +elevated BP   Eyes:  Positive for visual disturbance (L eye blurry vision, neg complete vision loss). Negative for photophobia.   Respiratory:  Positive for cough. Negative for shortness of breath.    Cardiovascular:  Positive for chest pain. Negative for leg swelling.   Gastrointestinal:  Positive for abdominal pain, nausea and vomiting. Negative for blood in stool, constipation and diarrhea.   Genitourinary:  Negative for dysuria, flank pain, frequency, hematuria and urgency.   Musculoskeletal:  Negative for neck pain and neck stiffness.   Skin:  Negative for rash and wound.   Neurological:  Negative for weakness, light-headedness, numbness and headaches.   Psychiatric/Behavioral:  Negative for confusion and suicidal ideas.    All other  systems reviewed and are negative.      Physical Exam     Initial Vitals [10/08/24 1700]   BP Pulse Resp Temp SpO2   (!) 215/102 74 18 99 °F (37.2 °C) 98 %      MAP       --         Physical Exam    Nursing note and vitals reviewed.  Constitutional: She appears well-developed and well-nourished. She is not diaphoretic. No distress.   HENT:   Head: Normocephalic and atraumatic. Mouth/Throat: Oropharynx is clear and moist. No oropharyngeal exudate.   Eyes: Conjunctivae and EOM are normal. Pupils are equal, round, and reactive to light. Right eye exhibits no discharge. Left eye exhibits no discharge.   Neck: Neck supple. No JVD present.   Normal range of motion.  Cardiovascular:  Normal rate, regular rhythm, normal heart sounds and intact distal pulses.     Exam reveals no gallop and no friction rub.       No murmur heard.  Pulmonary/Chest: No respiratory distress. She has wheezes. She has no rhonchi. She has no rales.   Mild wheezing to the L lower lung.   Abdominal: Abdomen is soft. Bowel sounds are normal. She exhibits no distension. There is no abdominal tenderness.   No abdominal tenderness. No CVA tenderness bilaterally.  There is no rebound and no guarding.   Musculoskeletal:         General: No tenderness or edema.      Cervical back: Normal range of motion and neck supple.     Lymphadenopathy:     She has no cervical adenopathy.   Neurological: She is alert and oriented to person, place, and time. No cranial nerve deficit.   Moves all extremities and carries on conversation. CN- II: PERRL; III/IV/VI: EOMI w/out evidence of nystagmus; V: no deficits appreciated to light touch bilateral face; VII: no facial weakness, no facial asymmetry. Eyebrow raise symmetric. Smile symmetric; IX/X: palate midline, and raises symmetrically; XI: shoulder shrug 5/5 bilaterally; XII: tongue is midline w/out asymmetry. Strength 5/5 to bilateral upper and lower extremities, sensation intact to light touch,     Skin: Skin is warm  and dry.   Psychiatric: She has a normal mood and affect. Thought content normal.         ED Course   Procedures  Labs Reviewed   CBC W/ AUTO DIFFERENTIAL - Abnormal       Result Value    WBC 7.21      RBC 4.17      Hemoglobin 13.9      Hematocrit 40.0      MCV 96      MCH 33.3 (*)     MCHC 34.8      RDW 12.6      Platelets 319      MPV 8.9 (*)     Immature Granulocytes 0.1      Gran # (ANC) 3.1      Immature Grans (Abs) 0.01      Lymph # 2.9      Mono # 0.6      Eos # 0.6 (*)     Baso # 0.05      nRBC 0      Gran % 43.2      Lymph % 40.1      Mono % 8.0      Eosinophil % 7.9      Basophil % 0.7      Differential Method Automated     URINALYSIS, REFLEX TO URINE CULTURE - Abnormal    Specimen UA Urine, Clean Catch      Color, UA Yellow      Appearance, UA Clear      pH, UA 7.0      Specific Gravity, UA 1.010      Protein, UA Negative      Glucose, UA Negative      Ketones, UA Trace (*)     Bilirubin (UA) Negative      Occult Blood UA Negative      Nitrite, UA Negative      Urobilinogen, UA Negative      Leukocytes, UA 1+ (*)     Narrative:     Specimen Source->Urine   URINALYSIS MICROSCOPIC - Abnormal    RBC, UA 2      WBC, UA 8 (*)     Bacteria Occasional      Squam Epithel, UA 3      Microscopic Comment SEE COMMENT      Narrative:     Specimen Source->Urine   COMPREHENSIVE METABOLIC PANEL    Sodium 140      Potassium 3.5      Chloride 104      CO2 23      Glucose 81      BUN 9      Creatinine 0.8      Calcium 10.5      Total Protein 8.4      Albumin 4.6      Total Bilirubin 0.9      Alkaline Phosphatase 60      AST 24      ALT 19      eGFR >60      Anion Gap 13     B-TYPE NATRIURETIC PEPTIDE    BNP 15     MAGNESIUM    Magnesium 2.0     TROPONIN I    Troponin I <0.006     LIPASE    Lipase 42     TROPONIN I    Troponin I <0.006     POCT URINE PREGNANCY    POC Preg Test, Ur Negative       Acceptable Yes     SARS-COV-2 RDRP GENE    POC Rapid COVID Negative       Acceptable Yes     POCT  INFLUENZA A/B MOLECULAR    POC Molecular Influenza A Ag Negative      POC Molecular Influenza B Ag Negative       Acceptable Yes            Imaging Results              CT Head Without Contrast (Final result)  Result time 10/08/24 21:05:52      Final result by Valente Wooten MD (10/08/24 21:05:52)                   Impression:      No CT evidence of acute intracranial abnormality. If the patient has an acute, focal neurological deficit, MRI of the brain may be indicated.    New paranasal sinus disease.  Suggest correlation for any clinical signs of sinusitis.      Electronically signed by: Valente Wooten MD  Date:    10/08/2024  Time:    21:05               Narrative:    EXAMINATION:  CT HEAD WITHOUT CONTRAST    CLINICAL HISTORY:  Headache, new or worsening, neuro deficit (Age 19-49y);    TECHNIQUE:  Low dose axial images were obtained through the head.  Coronal and sagittal reformations were also performed. Contrast was not administered.    COMPARISON:  CT head, 01/12/2024.    FINDINGS:  No evidence of acute territorial infarct, hemorrhage, mass effect, or midline shift.    Ventricles are normal in size and configuration.    No displaced calvarial fracture.    Patchy mucosal thickening in the paranasal sinuses.  Partially imaged air-fluid level in the right maxillary sinus.  Paranasal sinus findings are new when compared with prior study.  Question trace fluid in the right mastoid air cells.  Left mastoid air cells are essentially clear.                                       X-Ray Chest AP Portable (Final result)  Result time 10/08/24 17:41:45      Final result by Annabel Zarco MD (10/08/24 17:41:45)                   Impression:      No acute intrathoracic abnormality detected.      Electronically signed by: Annabel Zarco  Date:    10/08/2024  Time:    17:41               Narrative:    EXAMINATION:  AP PORTABLE CHEST    CLINICAL HISTORY:  Essential (primary)  hypertension    TECHNIQUE:  AP portable chest radiograph was submitted.    COMPARISON:  01/12/2024    FINDINGS:  AP portable chest radiograph demonstrates a cardiac silhouette within normal limits.  There is no focal consolidation, pneumothorax, or pleural effusion.                                       Medications   losartan tablet 50 mg (50 mg Oral Given 10/8/24 1808)   amLODIPine tablet 10 mg (10 mg Oral Given 10/8/24 1808)   hydrALAZINE injection 10 mg (10 mg Intravenous Given 10/8/24 1948)     Medical Decision Making  Amount and/or Complexity of Data Reviewed  Labs: ordered. Decision-making details documented in ED Course.  Radiology: ordered. Decision-making details documented in ED Course.  ECG/medicine tests: ordered and independent interpretation performed. Decision-making details documented in ED Course.    Risk  Prescription drug management.      44-year-old female with past history of hypertension and anxiety presents with multiple complaints.  Her primary concern is that her blood pressure was elevated today.  She states this morning it was 136/86.  She then went to work and took it again and noticed it to be 186/110 and she felt nervous so she called her doctor and told them to come in.  She did not take her blood pressure medicines last night but last took it on Sunday night.  She does report a cough for the last 5 days productive of mucus.  She states she has a roommate who has sick 2 weeks ago with a similar cough.  She denies any shortness of breath.  Denies any chest pain with cough.  She did report chest pain from 8 to noon this morning but not currently.  She was taking cough medicine including Advil cold and flu morning and nighttime doses, she tried to switch to Coricidin but felt shaky with it and then she took a homeopathic medication called oscillococcinum.  She was coughing today after eating food and then all the sudden felt nauseous and went to the bathroom and had 2 episodes of  vomiting.  Denies any abdominal pain.  Denies any bowel or bladder complaints.  Denies any fever.  She was not had a period in a long time as she was on Depo shots.  She was unsure her doses of her medications.  She reports occasional alcohol use but has not drank recently.  Reports 1 pack per week of smoking.  Denies any illicit drug use.  On exam patient noted to have significantly elevated blood pressure.  It was already improved on re-evaluation in the room to systolic 200.  Her neuro exam is within normal limits.  Abdomen is soft and nontender.  She has very mild wheezing to the left lower lung.  Differential diagnosis includes was not limited to bronchitis, medication noncompliance, hypertensive emergency, hypertensive urgency, flu, COVID, pneumonia, anemia, metabolic derangement.  We will obtain chest x-ray, labs, give her home medications, urine and UPT for further evaluation.    Labs with negative troponin x2.  Urine with trace ketones without protein or occult infection.  Pregnancy test negative.  COVID and flu negative.  No leukocytosis or anemia.  No metabolic derangement.  Creatinine within normal limits.  Vision 20/20 bilaterally.  BNP and lipase within normal limits.  Chest x-ray with no acute findings.  CT head with no acute findings.  Patient given her home blood pressure medicines and 1 dose of hydralazine with improvement of her blood pressure to systolic 150s.  Suspect likely secondary to viral URI, cold medicine and missing her dose of blood pressure medicine.  Advised to take her medicine as directed, decrease salt in her diet and not take any over-the-counter cold medicine.  Keep a blood pressure log at home.  Follow up closely with her primary care doctor in 2-3 days for recheck of symptoms.  She was given strict return precautions for any signs or symptoms of hypertensive emergency.  She was in agreement plan and all questions answered we will discharge at this time.            Slade  Attestation:   Scribe #1: I performed the above scribed service and the documentation accurately describes the services I performed. I attest to the accuracy of the note.        ED Course as of 10/09/24 0426   Tue Oct 08, 2024   1741 EKG 12-lead  Has been at 77.  No ST elevation or significant depression.  T-wave inversions in V1.  Normal axis.  QTC is 409. [JT]      ED Course User Index  [JT] Courtney Lockhart MD                       I, Courtney Lockhart, personally performed the services described in this documentation. All medical record entries made by the scribe were at my direction and in my presence. I have reviewed the chart and agree that the record reflects my personal performance and is accurate and complete.      DISCLAIMER: This note was prepared with [employer] voice recognition transcription software. Garbled syntax, mangled pronouns, and other bizarre constructions may be attributed to that software system.      Clinical Impression:  Final diagnoses:  [R11.10] Vomiting  [I10] Hypertension  [R09.81] Nasal congestion (Primary)          ED Disposition Condition    Discharge Stable          ED Prescriptions       Medication Sig Dispense Start Date End Date Auth. Provider    cetirizine (ZYRTEC) 10 MG tablet Take 1 tablet (10 mg total) by mouth once daily. 30 tablet 10/8/2024 10/8/2025 Courtney Lockhart MD    benzonatate (TESSALON) 100 MG capsule Take 1 capsule (100 mg total) by mouth 3 (three) times daily as needed for Cough. 20 capsule 10/8/2024 10/18/2024 Courtney Lockhart MD    ondansetron (ZOFRAN-ODT) 4 MG TbDL Take 1 tablet (4 mg total) by mouth every 8 (eight) hours as needed (nausea). 12 tablet 10/8/2024 -- Courtney Lockhart MD          Follow-up Information       Follow up With Specialties Details Why Contact [facility] - Emergency Dept Emergency Medicine  As needed, If symptoms worsen [address] [facility] [place] [id number] [phone]    Sameera  [facility] -  Schedule an appointment as soon as possible for a visit in 2 days to discuss recent ED visit, to establish primary doctor [address] [place] [address]  [phone]               Courtney Lockhart MD  10/09/24 0425

## 2024-10-09 LAB
OHS QRS DURATION: 78 MS
OHS QTC CALCULATION: 409 MS

## 2024-10-09 NOTE — DISCHARGE INSTRUCTIONS
Take your blood pressure at home twice a day for 3 days and write these numbers down, bring with you to your primary care doctor in 2-3 days for blood pressure recheck as you may need your medications changed. Return to the ED for chest pain, shortness of breath, fever, numbness, weakness, loss of vision or any other concerns.     Thank you for coming to our Emergency Department today. As we discussed, it is important to remember that some problems are difficult to diagnose and may not be found during your Emergency Department visit. Be sure to follow up with your primary care doctor and review all labs/imaging/tests that were performed during this visit with them. Some labs/tests may be outside of the normal range and require non-emergent follow-up and further investigation to help diagnose/exclude/prevent complications or other medical conditions.    If you do not have a primary care doctor, you may contact the one listed on your discharge paperwork or you may also call the [facility] Appointment Desk at [phone] to schedule an appointment and establish care with one. It is important to your health that you have a primary care doctor.    Please take all medications as directed. All medications may potentially have side-effects and it is impossible to predict which medications may give you side-effects or what side-effects (if any) they will give you.. If you feel that you are having a negative effect or side-effect of any medication you should immediately stop taking them and seek medical attention. If you feel that you are having a life-threatening reaction call 911.    Return to the ER with any questions/concerns, new/concerning symptoms, worsening or failure to improve.     Do not drive, swim, climb to height, take a bath or make any important decisions for 24 hours if you have received any pain medications, sedatives or mood altering drugs during your ER visit.

## 2024-12-17 ENCOUNTER — CLINICAL SUPPORT (OUTPATIENT)
Facility: CLINIC | Age: 44
End: 2024-12-17
Payer: MEDICAID

## 2024-12-17 DIAGNOSIS — Z30.9 ENCOUNTER FOR CONTRACEPTIVE MANAGEMENT, UNSPECIFIED TYPE: Primary | ICD-10-CM

## 2024-12-17 PROCEDURE — 99999PBSHW PR PBB SHADOW TECHNICAL ONLY FILED TO HB: Mod: PBBFAC,,,

## 2024-12-17 PROCEDURE — 96372 THER/PROPH/DIAG INJ SC/IM: CPT | Mod: PBBFAC,PN

## 2024-12-17 RX ORDER — MEDROXYPROGESTERONE ACETATE 150 MG/ML
150 INJECTION, SUSPENSION INTRAMUSCULAR
Status: COMPLETED | OUTPATIENT
Start: 2024-12-17 | End: 2024-12-17

## 2024-12-17 RX ORDER — MEDROXYPROGESTERONE ACETATE 150 MG/ML
150 INJECTION, SUSPENSION INTRAMUSCULAR ONCE
Qty: 1 ML | Refills: 0 | Status: SHIPPED | OUTPATIENT
Start: 2024-12-17 | End: 2024-12-17 | Stop reason: CLARIF

## 2024-12-17 RX ADMIN — MEDROXYPROGESTERONE ACETATE 150 MG: 150 INJECTION, SUSPENSION INTRAMUSCULAR at 03:12

## 2024-12-17 NOTE — PROGRESS NOTES
Pt tolerated depo well.gave pain scale of 0.pt instructed to come back between march 4th-march18th

## 2025-07-09 ENCOUNTER — OFFICE VISIT (OUTPATIENT)
Facility: CLINIC | Age: 45
End: 2025-07-09

## 2025-07-09 ENCOUNTER — TELEPHONE (OUTPATIENT)
Facility: CLINIC | Age: 45
End: 2025-07-09

## 2025-07-09 ENCOUNTER — PATIENT MESSAGE (OUTPATIENT)
Dept: ADMINISTRATIVE | Facility: OTHER | Age: 45
End: 2025-07-09

## 2025-07-09 DIAGNOSIS — I10 PRIMARY HYPERTENSION: Primary | ICD-10-CM

## 2025-07-09 DIAGNOSIS — J30.89 ENVIRONMENTAL AND SEASONAL ALLERGIES: ICD-10-CM

## 2025-07-09 DIAGNOSIS — M54.9 DORSALGIA: ICD-10-CM

## 2025-07-09 PROCEDURE — 98005 SYNCH AUDIO-VIDEO EST LOW 20: CPT | Mod: 95,,,

## 2025-07-09 RX ORDER — IBUPROFEN 800 MG/1
800 TABLET, FILM COATED ORAL EVERY 8 HOURS PRN
Qty: 30 TABLET | Refills: 1 | Status: SHIPPED | OUTPATIENT
Start: 2025-07-09

## 2025-07-09 RX ORDER — CETIRIZINE HYDROCHLORIDE 10 MG/1
10 TABLET ORAL DAILY
Qty: 30 TABLET | Refills: 1 | Status: SHIPPED | OUTPATIENT
Start: 2025-07-09 | End: 2025-09-07

## 2025-07-09 RX ORDER — AMLODIPINE BESYLATE 10 MG/1
10 TABLET ORAL DAILY
Qty: 30 TABLET | Refills: 0 | Status: SHIPPED | OUTPATIENT
Start: 2025-07-09 | End: 2025-08-08

## 2025-07-09 RX ORDER — LOSARTAN POTASSIUM 50 MG/1
50 TABLET ORAL DAILY
Qty: 30 TABLET | Refills: 0 | Status: SHIPPED | OUTPATIENT
Start: 2025-07-09 | End: 2025-08-08

## 2025-07-09 NOTE — LETTER
July 9, 2025      [facility] [place] - [facility]  [address]  [place] [address]  Phone: [phone]  Fax: [phone]       Patient: Malena Mulligan   YOB: 1980  Date of Visit: 07/09/2025    To Whom It May Concern:    Alon Mulligan  was at [facility] on 07/09/2025. The patient may return to work/school on 07/10/2025 with no restrictions. If you have any questions or concerns, or if I can be of further assistance, please do not hesitate to contact me.    Sincerely,    Sherlyn Acosta MA

## 2025-07-09 NOTE — LETTER
July 9, 2025      [facility] [place] [facility]  [address]  [place] [address]  Phone: [phone]  Fax: [phone]       Patient: Malena Mulligan   YOB: 1980  Date of Visit: 07/09/2025    To Whom It May Concern:    Alon Mulligan  was at [facility] on 07/09/2025. The patient may return to work/school on 07/10/2025 with no restrictions. If you have any questions or concerns, or if I can be of further assistance, please do not hesitate to contact me.    Sincerely,    Sherlyn Acosta MA

## 2025-07-09 NOTE — PROGRESS NOTES
The patient location is: Patient Home  The chief complaint leading to consultation is: as below  Visit type:   Virtual visit with synchronous audio and video    Total time spent with patient: 15 minutes  Each patient to whom he or she provides medical services by telemedicine is:  (1) informed of the relationship between the physician and patient and the respective role of any other health care provider with respect to management of the patient; and (2) notified that she may decline to receive medical services by telemedicine and may withdraw from such care at any time.      KATHERYN Mulligan is a 44 y.o. female with multiple medical diagnoses as listed in the medical history and problem list that presents for .      History of Present Illness    CHIEF COMPLAINT:  Ms. Mulligan presents today with back pain from a recent injury.    HISTORY OF PRESENT ILLNESS:  She reports acute back pain starting Monday, likely from lifting groceries. Pain is located right above the waist causing significant discomfort and difficulty sitting. She has been self-managing with alternating ice packs and heating pad. She denies any fall or traumatic injury as the cause of pain. She is currently experiencing persistent ache and discomfort that impacts her ability to sit comfortably.    HYPERTENSION:  She presents with uncontrolled hypertension with self-measured blood pressure of 163/123. She was previously on Amlodipine and Losartan but ran out of medications in January. She has been attempting to manage blood pressure through natural remedies including beetroot juice and garlic, reporting a previous self-measured blood pressure of 143/88. She has had headaches when blood pressure is elevated, which is unusual for her as she does not typically have headaches. She expresses reluctance to take medications due to general discomfort with pharmaceutical treatments. She denies chest pain, palpitations, shortness of breath, or vision  changes.    LOSS OF TASTE AND SMELL:  She reports loss of taste and smell for approximately two weeks. Recent COVID-19 test was negative. She denies worsening of symptoms.    ALLERGIES:  She has seasonal allergies with severe mucus and phlem buildup. She currently manages symptoms with Xyzal 5 mg at night. She experiences significant nasal congestion and post-nasal drip.    SURGICAL HISTORY:  She had a previous deviated septum repair performed by an ENT surgeon. She was advised by her surgeon to avoid nasal sprays for one year following the procedure.      ROS:  General: -fever, -chills, -fatigue, -weight gain, -weight loss  Eyes: -vision changes, -redness, -discharge  ENT: -ear pain, +nasal congestion, -sore throat, +nasal discharge  Cardiovascular: -chest pain, -palpitations, -lower extremity edema  Respiratory: -cough, -shortness of breath  Gastrointestinal: -abdominal pain, -nausea, -vomiting, -diarrhea, -constipation, -blood in stool  Genitourinary: -dysuria, -hematuria, -frequency  Musculoskeletal: -joint pain, -muscle pain, +back pain  Skin: -rash, -lesion  Neurological: +headache, -dizziness, -numbness, -tingling  Psychiatric: -anxiety, -depression, -sleep difficulty  Head: +loss of smell, +loss of taste, +head pain  Endocrine: +hot flashes             Assessment & Plan     Problem List Items Addressed This Visit    None  Visit Diagnoses         Primary hypertension    -  Primary   Advised to maintain a low Na diet(<2g/day), exercise, and keep BP log to present to next visit     Relevant Medications    amLODIPine (NORVASC) 10 MG tablet    losartan (COZAAR) 50 MG tablet      Dorsalgia      Pt encouraged to apply ice packs 2-3 times daily at 10 minute intervals x 72 hours, then okay to change to heating compress with care not to burn her self; she  voiced understanding      Relevant Medications    ibuprofen (ADVIL,MOTRIN) 800 MG tablet      Environmental and seasonal allergies        Relevant Medications     cetirizine (ZYRTEC) 10 MG tablet            Assessment & Plan    Assessed back pain as likely strain , recommending conservative management initially.  Evaluated loss of smell and taste, considering allergies as potential cause given negative COVID test.  HTN uncontrolled due to medication non-adherence for 6 months.    ## ESSENTIAL HYPERTENSION:  - Ms. Khans blood pressure is dangerously elevated at 163/123, indicating uncontrolled hypertension.  - Ms. Mulligan has been off prescribed medications for 6 months, attempting natural remedies instead, .  - Despite elevated BP, patient denies chest pain, palpitations, dyspnea, or visual changes.  - Restarted antihypertensive medications: amlodipine 10 mg daily and losartan 50 mg daily.  - Recommend limiting sodium intake.  - Instructed patient to check BP every morning for 2 weeks and maintain a log.  - Emphasized importance of medication adherence and discussed risks associated with uncontrolled hypertension.  - Advised to contact office immediately if experiencing chest pain, palpitations, dyspnea, or changes in vision.    ## LOW BACK PAIN:  - Ms. Mulligan reports low back pain above the waist with difficulty sitting and pain when standing, likely due to strain or sprain from picking up groceries.  - Prescribed ibuprofen 800 mg for pain relief.  - Instructed to apply ice to lower back 3 times daily for 10 minutes for 3 consecutive days, then switch to heat application.  - Advised to perform stretching exercises for lower back and stay active, avoiding prolonged sedentary periods to prevent exacerbation of pain.    ## ANOSMIA AND PARAGEUSIA:  - Ms. Mulligan reports loss of smell and taste for 2 weeks.  - Home COVID-19 test was negative.  - Discussed possible etiologies including COVID-19 and allergies.  - Symptoms may be related to seasonal allergies (see below).    ## SEASONAL ALLERGIC RHINITIS:  - Ms. Mulligan experiencing nasal congestion with mucus and phlem  buildup, as well as post nasal drip, possibly related to seasonal allergies.  - Explained potential environmental factors, including ceiling fan dust, and recommended cleaning fan blades to reduce dust circulation.  - Switched antihistamine from Xyzal to Zyrtec daily due to potential tolerance development.  - Advised to continue using saline nasal rinse and Budesonide nasal rinse as previously instructed by ENT.  - Zyrtec may also help with potential allergy-related anosmia and parageusia.    ## HEADACHE:  - Ms. Mulligan reports experiencing unusual headaches, which she associates with high blood pressure readings.    ## FOLLOW-UP:  - Scheduled follow up in 2 weeks for BP check with nurse.  - Ms. Mulligan advised to bring BP log to appointment.         --------------------------------------------      Health Maintenance:  Health Maintenance         Date Due Completion Date    Hepatitis C Screening Never done ---    HIV Screening Never done ---    Pneumococcal Vaccines (Age 0-49) (1 of 2 - PCV) Never done ---    COVID-19 Vaccine (1 - 2024-25 season) Never done ---    Mammogram 10/04/2025 10/4/2024    Influenza Vaccine (1) 09/01/2025 ---    Hemoglobin A1c (Diabetic Prevention Screening) 12/11/2026 12/11/2023    TETANUS VACCINE 05/17/2028 5/17/2018    Cervical Cancer Screening 09/30/2029 9/30/2024    RSV Vaccine (Age 60+ and Pregnant patients) (1 - 1-dose 75+ series) 07/30/2055 ---                Follow Up:  Follow up in about 2 weeks (around 7/23/2025).    Exam     Review of Systems:  (as noted above)    Physical Exam  Constitutional:       General: She is not in acute distress.     Appearance: She is not ill-appearing.   Neurological:      Mental Status: She is alert.        There were no vitals filed for this visit.   There is no height or weight on file to calculate BMI.        History     Past Medical History:  Past Medical History:   Diagnosis Date    Anxiety     Hypertension        Past Surgical History:  Past  Surgical History:   Procedure Laterality Date    EYE SURGERY      NASAL SEPTUM SURGERY  08/07/2024       Social History:  Social History[1]    Family History:  Family History   Problem Relation Name Age of Onset    Kidney cancer Mother      Hypertension Mother      Hypertension Father      Heart disease Father      Breast cancer Neg Hx      Ovarian cancer Neg Hx      Colon cancer Neg Hx         Allergies and Medications: (updated and reviewed)  Review of patient's allergies indicates:  No Known Allergies  Current Medications[2]    Patient Care Team:  Anette Marrufo MD as PCP - General (Family Medicine)  Josie Singleton FNP as Nurse Practitioner (Internal Medicine)       - The patient was sent an After Visit Summary virtually that lists all medications with directions, allergies, education, orders placed during this encounter and follow-up instructions.      - I have reviewed the patient's medical information including past medical, family, and social history sections including the medications and allergies.      - We discussed the patient's current medications.     This note was created by combination of typed  and MModal dictation.  Transcription errors may be present.  If there are any questions, please contact me.  This note was generated with the assistance of ambient listening technology. Verbal consent was obtained by the patient and accompanying visitor(s) for the recording of patient appointment to facilitate this note. I attest to having reviewed and edited the generated note for accuracy, though some syntax or spelling errors may persist. Please contact the author of this note for any clarification.      KELLEN Varela              The patient location is: [place]       [1]   Social History  Socioeconomic History    Marital status: Single   Tobacco Use    Smoking status: Every Day     Current packs/day: 0.50     Average packs/day: 0.7 packs/day for 2.5 years (1.8 ttl  pk-yrs)     Types: Cigarettes     Start date: 2023    Smokeless tobacco: Former   Substance and Sexual Activity    Alcohol use: Yes     Comment: OCCASIONALLY    Drug use: Yes     Types: Marijuana    Sexual activity: Not Currently     Partners: Female     Birth control/protection: Injection     Social Drivers of Health     Financial Resource Strain: Medium Risk (7/9/2025)    Overall Financial Resource Strain (CARDIA)     Difficulty of Paying Living Expenses: Somewhat hard   Food Insecurity: Food Insecurity Present (7/9/2025)    Hunger Vital Sign     Worried About Running Out of Food in the Last Year: Sometimes true     Ran Out of Food in the Last Year: Sometimes true   Transportation Needs: Unmet Transportation Needs (7/9/2025)    PRAPARE - Transportation     Lack of Transportation (Medical): Yes     Lack of Transportation (Non-Medical): Yes   Physical Activity: Sufficiently Active (7/9/2025)    Exercise Vital Sign     Days of Exercise per Week: 3 days     Minutes of Exercise per Session: 60 min   Stress: Stress Concern Present (7/9/2025)    [employer] - Occupational Stress Questionnaire     Feeling of Stress : To some extent   Housing Stability: Low Risk  (7/9/2025)    Housing Stability Vital Sign     Unable to Pay for Housing in the Last Year: No     Number of Times Moved in the Last Year: 0     Homeless in the Last Year: No   [2]   Current Outpatient Medications   Medication Sig Dispense Refill    amLODIPine (NORVASC) 10 MG tablet Take 1 tablet (10 mg total) by mouth once daily. 30 tablet 0    budesonide 1 mg/2 mL NbSp Inhalation for 30 Days      cetirizine (ZYRTEC) 10 MG tablet Take 1 tablet (10 mg total) by mouth once daily. 30 tablet 1    ibuprofen (ADVIL,MOTRIN) 800 MG tablet Take 1 tablet (800 mg total) by mouth every 8 (eight) hours as needed for Pain. 30 tablet 1    losartan (COZAAR) 50 MG tablet Take 1 tablet (50 mg total) by mouth once daily. 30 tablet 0    ondansetron  (ZOFRAN-ODT) 4 MG TbDL Take 1 tablet (4 mg total) by mouth every 8 (eight) hours as needed (nausea). 12 tablet 0     No current facility-administered medications for this visit.